# Patient Record
Sex: FEMALE | Race: WHITE
[De-identification: names, ages, dates, MRNs, and addresses within clinical notes are randomized per-mention and may not be internally consistent; named-entity substitution may affect disease eponyms.]

---

## 2020-10-30 ENCOUNTER — HOSPITAL ENCOUNTER (OUTPATIENT)
Dept: HOSPITAL 76 - LAB.S | Age: 65
Discharge: HOME | End: 2020-10-30
Attending: NURSE PRACTITIONER
Payer: COMMERCIAL

## 2020-10-30 DIAGNOSIS — Z00.00: Primary | ICD-10-CM

## 2020-10-30 DIAGNOSIS — E03.2: ICD-10-CM

## 2020-10-30 DIAGNOSIS — E78.5: ICD-10-CM

## 2020-10-30 DIAGNOSIS — E55.9: ICD-10-CM

## 2020-10-30 LAB
ALBUMIN DIAFP-MCNC: 3.9 G/DL (ref 3.2–5.5)
ALBUMIN/GLOB SERPL: 1.5 {RATIO} (ref 1–2.2)
ALP SERPL-CCNC: 45 IU/L (ref 42–121)
ALT SERPL W P-5'-P-CCNC: 18 IU/L (ref 10–60)
ANION GAP SERPL CALCULATED.4IONS-SCNC: 7 MMOL/L (ref 6–13)
AST SERPL W P-5'-P-CCNC: 22 IU/L (ref 10–42)
BASOPHILS NFR BLD AUTO: 0 10^3/UL (ref 0–0.1)
BASOPHILS NFR BLD AUTO: 0.8 %
BILIRUB BLD-MCNC: 0.6 MG/DL (ref 0.2–1)
BUN SERPL-MCNC: 15 MG/DL (ref 6–20)
CALCIUM UR-MCNC: 9 MG/DL (ref 8.5–10.3)
CHLORIDE SERPL-SCNC: 102 MMOL/L (ref 101–111)
CHOLEST SERPL-MCNC: 214 MG/DL
CO2 SERPL-SCNC: 27 MMOL/L (ref 21–32)
CREAT SERPLBLD-SCNC: 0.7 MG/DL (ref 0.4–1)
EOSINOPHIL # BLD AUTO: 0.2 10^3/UL (ref 0–0.7)
EOSINOPHIL NFR BLD AUTO: 3.4 %
ERYTHROCYTE [DISTWIDTH] IN BLOOD BY AUTOMATED COUNT: 12.6 % (ref 12–15)
FERRITIN SERPL-MCNC: 17.7 NG/ML (ref 11–306.8)
GLOBULIN SER-MCNC: 2.6 G/DL (ref 2.1–4.2)
GLUCOSE SERPL-MCNC: 92 MG/DL (ref 70–100)
HDLC SERPL-MCNC: 84 MG/DL
HDLC SERPL: 2.5 {RATIO} (ref ?–4.4)
HGB UR QL STRIP: 13.6 G/DL (ref 12–16)
LYMPHOCYTES # SPEC AUTO: 2 10^3/UL (ref 1.5–3.5)
LYMPHOCYTES NFR BLD AUTO: 40.6 %
MCH RBC QN AUTO: 32.4 PG (ref 27–31)
MCHC RBC AUTO-ENTMCNC: 32.3 G/DL (ref 32–36)
MCV RBC AUTO: 100.2 FL (ref 81–99)
MONOCYTES # BLD AUTO: 0.4 10^3/UL (ref 0–1)
MONOCYTES NFR BLD AUTO: 7.6 %
NEUTROPHILS # BLD AUTO: 2.4 10^3/UL (ref 1.5–6.6)
NEUTROPHILS # SNV AUTO: 5 X10^3/UL (ref 4.8–10.8)
NEUTROPHILS NFR BLD AUTO: 47.2 %
PDW BLD AUTO: 10.3 FL (ref 7.9–10.8)
PLATELET # BLD: 304 10^3/UL (ref 130–450)
PROT SPEC-MCNC: 6.5 G/DL (ref 6.7–8.2)
RBC MAR: 4.2 10^6/UL (ref 4.2–5.4)
SODIUM SERPLBLD-SCNC: 136 MMOL/L (ref 135–145)
T3FREE SERPL-MCNC: 2.6 PG/ML (ref 2.5–3.9)
T4 FREE SERPL-MCNC: 1.29 NG/DL (ref 0.58–1.64)
T4 SERPL-MCNC: 7.29 UG/DL (ref 6.09–12.23)
TSH SERPL-ACNC: 4.44 UIU/ML (ref 0.34–5.6)

## 2020-10-30 PROCEDURE — 83721 ASSAY OF BLOOD LIPOPROTEIN: CPT

## 2020-10-30 PROCEDURE — 85025 COMPLETE CBC W/AUTO DIFF WBC: CPT

## 2020-10-30 PROCEDURE — 80053 COMPREHEN METABOLIC PANEL: CPT

## 2020-10-30 PROCEDURE — 84481 FREE ASSAY (FT-3): CPT

## 2020-10-30 PROCEDURE — 84436 ASSAY OF TOTAL THYROXINE: CPT

## 2020-10-30 PROCEDURE — 80061 LIPID PANEL: CPT

## 2020-10-30 PROCEDURE — 36415 COLL VENOUS BLD VENIPUNCTURE: CPT

## 2020-10-30 PROCEDURE — 84439 ASSAY OF FREE THYROXINE: CPT

## 2020-10-30 PROCEDURE — 84443 ASSAY THYROID STIM HORMONE: CPT

## 2020-10-30 PROCEDURE — 82728 ASSAY OF FERRITIN: CPT

## 2020-10-30 PROCEDURE — 82306 VITAMIN D 25 HYDROXY: CPT

## 2021-03-03 ENCOUNTER — HOSPITAL ENCOUNTER (OUTPATIENT)
Dept: HOSPITAL 76 - DI | Age: 66
Discharge: HOME | End: 2021-03-03
Attending: NURSE PRACTITIONER
Payer: COMMERCIAL

## 2021-03-03 DIAGNOSIS — R92.8: Primary | ICD-10-CM

## 2021-03-03 DIAGNOSIS — N60.02: ICD-10-CM

## 2021-03-03 DIAGNOSIS — N60.01: ICD-10-CM

## 2021-03-04 NOTE — ULTRASOUND REPORT
LIMITED ULTRASOUND OF RIGHT BREAST: 3/3/2021

CLINICAL: Patient returns for additional imaging over a suspected mass in the right breast.  

 

Comparison is made to exams dated:  3/3/2021 mammogram, 11/4/2019 mammogram, 12/22/2015 mammogram - [facility], 1/30/2014 mammogram, 5/23/2012 ultrasound, and 5/23/2012 mammogram - [facility].  

Color flow ultrasound of the right breast 11 o'clock region was performed.  Gray scale images of the 
real-time examination were reviewed.  

 

There is a benign 1.8 cm x 1.8 cm x 1 cm oval cyst in the right breast at 11 o'clock middle depth 3 c
m from the nipple.  This oval cyst is anechoic.   Color flow imaging demonstrates that there is no va
scularity present. This correlates well with mammography findings.  

 

 

IMPRESSION: BENIGN 

There is no sonographic evidence of malignancy.  

The 1.8 cm cyst in the right breast is consistent with a simple cyst, corresponds to the mammographic
 finding, and is benign.  

Return to annual mammogram screening schedule is recommended.   Findings and recommendations were con
veyed to the patient at time of exam. 

 

This exam was interpreted at Station ID: [id number].  

Electronically Signed By: Jerri jarquin/:3/3/2021 10:56:20  

 

 

 

Ultrasound BI-RADS: 2 Benign

BI-RADS CATEGORY: (2) - 2

RECOMMENDATION: (ANNUAL)  - Recommend routine annual screening mammography.

[id number]

return to screening

LATERALITY: (B)

## 2021-03-04 NOTE — ULTRASOUND REPORT
LIMITED ULTRASOUND OF LEFT BREAST: 3/3/2021

CLINICAL: Patient returns for additional imaging over a suspected mass in the left breast.  

 

Comparison is made to exams dated:  3/3/2021 mammogram, 11/4/2019 mammogram, 12/22/2015 mammogram - [facility], 1/30/2014 mammogram, 5/23/2012 mammogram, and 5/15/2012 mammogram - [facility].  

 Color flow ultrasound of the left breast 2 o'clock region was performed.  Gray scale images of the r
eal-time examination were reviewed.  

 

There is a benign 1.6 cm x 1.4 cm x 1 cm oval cyst in the left breast at 2 o'clock middle depth 4 cm 
from the nipple.  This oval cyst is anechoic.  This correlates with mammography findings.  Color flow
 imaging demonstrates that there is no vascularity present.  

There also is a benign 1.3 cm x 1.2 cm x 5 cm oval cyst in the left breast at 2 o'clock middle depth 
4 cm from the nipple.  This oval cyst is anechoic.  This correlates with mammography findings.  Color
 flow imaging demonstrates that there is no vascularity present.  

 

 

IMPRESSION: BENIGN 

The 1.6 cm cyst in the left breast at 2 o'clock middle depth correlates with mammography, is consiste
nt with a simple cyst and is benign.  

The 1.3 cm oval cyst in the left breast at 2 o'clock middle depth correlates with mammography, is con
sistent with a simple cyst and is benign.  

There is no sonographic evidence of malignancy.  

Return to annual mammogram screening schedule is recommended.   

Findings and recommendations were conveyed to the patient at time of exam. 

 

This exam was interpreted at Station ID: [id number].  

Electronically Signed By: Jerri jarquin/:3/3/2021 10:58:52  

 

 

 

Ultrasound BI-RADS: 2 Benign

BI-RADS CATEGORY: (2) - 2

RECOMMENDATION: (ANNUAL)  - Recommend routine annual screening mammography.

[id number]

return to screening

LATERALITY: (B)

## 2021-03-04 NOTE — MAMMOGRAPHY REPORT
BILATERAL DIGITAL DIAGNOSTIC MAMMOGRAM 3D/2D: 3/3/2021

CLINICAL: Additional evaluation requested from prior study.  

 

Comparison is made to exams dated:  11/4/2019 mammogram, 12/22/2015 mammogram - [facility], 1/30/2014 mammogram, and 12/3/2010 mammogram - [facility].  The tissue of bot
h breasts is heterogeneously dense. This may lower the sensitivity of mammography.  

 

There is a 1.7 cm round equal density asymmetry with a circumscribed margin in the right breast at 11
 o'clock middle depth.  This is seen in additional views.  This has increased in size over the years,
 only minimally since the most recent prior study.  

There is a 1.6 cm round equal density asymmetry with a circumscribed margin in the left breast at 1 o
'clock posterior depth.  This is seen in additional views.  This has increased in size.  

No other significant masses or calcifications are seen in either breast.  

 

IMPRESSION: INCOMPLETE: NEEDS ADDITIONAL IMAGING EVALUATION

The 1.7 cm round equal density asymmetry in the right breast at 11 o'clock middle depth is indetermin
ate.  An ultrasound is recommended.  

The 1.6 cm round equal density asymmetry in the left breast at 1 o'clock posterior depth is indetermi
shonna.  An ultrasound is recommended.  

Bilateral breast ultrasound was performed immediately following this exam. 

 

 

This exam was interpreted at Station ID: [id number].  

 

NOTE: For mammograms, a report in lay terms will be sent to the patient. Approximately 15% of breast 
malignancies will not be visualized mammographically. In the management of a palpable breast mass, a 
negative mammogram must not discourage biopsy of a clinically suspicious lesion.

 

Electronically Signed By: Jerri jarquin/:3/3/2021 10:07:41  

 

 

 

ACR BI-RADS Category 0: Incomplete 3340F

PARENCHYMAL PATTERN: (D) - The breast(s) demonstrate(s) heterogeneously dense fibroglandular parlisa rausch.

BI-RADS CATEGORY: (0) - 0

Ultrasound

[id number]

Immediate follow-up

LATERALITY: (B)